# Patient Record
Sex: FEMALE | Race: WHITE | URBAN - METROPOLITAN AREA
[De-identification: names, ages, dates, MRNs, and addresses within clinical notes are randomized per-mention and may not be internally consistent; named-entity substitution may affect disease eponyms.]

---

## 2017-03-03 ENCOUNTER — OFFICE VISIT (OUTPATIENT)
Dept: URGENT CARE | Facility: RETAIL CLINIC | Age: 37
End: 2017-03-03
Payer: COMMERCIAL

## 2017-03-03 VITALS
OXYGEN SATURATION: 100 % | HEART RATE: 88 BPM | WEIGHT: 155.6 LBS | DIASTOLIC BLOOD PRESSURE: 73 MMHG | BODY MASS INDEX: 30.39 KG/M2 | SYSTOLIC BLOOD PRESSURE: 123 MMHG | TEMPERATURE: 98.1 F

## 2017-03-03 DIAGNOSIS — B35.8 TINEA FACIALE: ICD-10-CM

## 2017-03-03 DIAGNOSIS — R21 RASH: ICD-10-CM

## 2017-03-03 DIAGNOSIS — Z23 NEED FOR VACCINATION: Primary | ICD-10-CM

## 2017-03-03 PROCEDURE — 90471 IMMUNIZATION ADMIN: CPT | Performed by: NURSE PRACTITIONER

## 2017-03-03 PROCEDURE — 99213 OFFICE O/P EST LOW 20 MIN: CPT | Mod: 25 | Performed by: NURSE PRACTITIONER

## 2017-03-03 PROCEDURE — 90715 TDAP VACCINE 7 YRS/> IM: CPT | Performed by: NURSE PRACTITIONER

## 2017-03-03 RX ORDER — KETOCONAZOLE 20 MG/G
CREAM TOPICAL 2 TIMES DAILY
Qty: 30 G | Refills: 1 | Status: SHIPPED | OUTPATIENT
Start: 2017-03-03

## 2017-03-03 ASSESSMENT — PAIN SCALES - GENERAL: PAINLEVEL: NO PAIN (0)

## 2017-03-03 NOTE — PROGRESS NOTES
[facility] clinic note    SUBJECTIVE:  Sarah Stevens is a 36 year old female who presents to [facility] with chief complaint of a rash.  Onset of rash was 3 week(s) ago.   Rash is sudden onset and cyclic.  Location of the rash: bridge of nose up towards forehead.  Quality/symptoms of rash: itching, dry skin, redness and peals   Symptoms are mild and moderate and rash seems to be gets better & worse.  Previous history of a similar rash? No  Recent exposure history: none known    Associated symptoms include: itches.    Current Outpatient Prescriptions   Medication     Cyanocobalamin (VITAMIN B 12 PO)     IUD's (PARAGARD INTRAUTERINE COPPER)     IBUPROFEN PO     GuaiFENesin (MUCINEX PO)     No current facility-administered medications for this visit.        PAST MEDICAL HISTORY:   Past Medical History   Diagnosis Date     Asthma, exercise induced      Rhinitis, allergic, perennial        PAST SURGICAL HISTORY:   Past Surgical History   Procedure Laterality Date     No history of surgery       C/section, low transverse  07/13/10     , Low Transverse       FAMILY HISTORY:   Family History   Problem Relation Age of Onset     Arthritis Maternal Grandfather      Asthma Maternal Grandmother      CANCER Maternal Grandmother      metastatic     Arthritis Maternal Grandmother      HEART DISEASE Maternal Grandmother      Obesity Maternal Grandmother      CEREBROVASCULAR DISEASE Paternal Grandmother      Arthritis Paternal Grandmother      HEART DISEASE Paternal Grandmother      Depression Sister      eating disorder       SOCIAL HISTORY:   Social History   Substance Use Topics     Smoking status: Never Smoker     Smokeless tobacco: Never Used     Alcohol use No         ROS:     Need for vaccination  Rash      EXAM:   Vitals:    17 1147   BP: 123/73   BP Location: Right arm   Patient Position: Chair   Cuff Size: Adult Regular   Pulse: 88   Temp: 98.1  F (36.7  C)    TempSrc: Tympanic   SpO2: 100%   Weight: 155 lb 9.6 oz (70.6 kg)     GENERAL: alert, no acute distress.  SKIN: Rash description:    Distribution: localized  Location: face and at top/brdge of nose and partially onto the forehead.    Color: red scaley,  Lesion type: macular, plaque, blotchy with no other findings  GENERAL APPEARANCE: healthy, alert and no distress  EYES: EOMI,  PERRL, conjunctiva clear  HENT: ear canals and TM's normal.  Nose and mouth without ulcers, erythema or lesions  NECK: supple, non-tender to palpation, no adenopathy noted    ASSESSMENT:     Need for vaccination  Rash  Tinea faciale      PLAN:  Nizoral 2%  Treatment of pruritus can be difficult and often frustrating. Specific treatments exist for some, but not all.  Antihistamines are the most widely utilized agents for pruritus. (such as Benadryl & Zyrtec).  Appropriate skin care is imperative.  Avoidance of scratching, and therefore secondary skin irritation and perpetuation of the itch-scratch cycle, is also important.    Avoidance of contact irritants such as wool clothing, cleansing agents, and pet dander may be helpful.    Many forms of pruritus can be worsened by dry skin and may improve with treatment for dry skin, including use of a humidifier, maintaining a cool environment, avoiding hot baths/showers, and using only mild soaps.      Lubrication options discussed.    Topical antipruritics such as a camphor-based lotion or oatmeal baths may offer temporary relief.  OTC Treatments were reviewed with Sarah Stevens  Patient informed to F/U with PCP if symptoms worsen or do not resolve.    Information on the above diagnosis was given to the patient.  Observe for signs of superimposed infection and systemic symptoms  Reassurance was given to the patient.  Watch for signs of fever or worsening of the rash.    If not improving Follow up at:  [facility] [phone]    Adalid VALVERDE, MSN, Family  NP-C  [facility]

## 2017-03-03 NOTE — NURSING NOTE
Chief Complaint   Patient presents with     Derm Problem     on face. about 3 weeks. red, itchy. no drainage     Imm/Inj     tdap       Initial /73 (BP Location: Right arm, Patient Position: Chair, Cuff Size: Adult Regular)  Pulse 88  Temp 98.1  F (36.7  C) (Tympanic)  Wt 155 lb 9.6 oz (70.6 kg)  SpO2 100%  BMI 30.39 kg/m2 Estimated body mass index is 30.39 kg/(m^2) as calculated from the following:    Height as of 1/5/10: 5' (1.524 m).    Weight as of this encounter: 155 lb 9.6 oz (70.6 kg).  Medication Reconciliation: complete   Idalia Love CMA (AAMA)

## 2017-03-03 NOTE — MR AVS SNAPSHOT
After Visit Summary   3/3/2017    Sarah Stevens    MRN: [id number]           Patient Information     Date Of Birth          1980        Visit Information        Provider Department      3/3/2017 11:20 AM Adalid Stokes APRN [facility]        Today's Diagnoses     Need for vaccination    -  1    Rash        Tinea faciale           Follow-ups after your visit        Who to contact     You can reach your care team any time of the day by calling [phone].  Notification of test results:  If you have an abnormal lab result, we will notify you by phone as soon as possible.         Additional Information About Your Visit        MyChart Information     [employer] gives you secure access to your electronic health record. If you see a primary care provider, you can also send messages to your care team and make appointments. If you have questions, please call your primary care clinic.  If you do not have a primary care provider, please call [phone] and they will assist you.        Care EveryWhere ID     This is your Care EveryWhere ID. This could be used by other organizations to access your [place] medical records  [id number]        Your Vitals Were     Pulse Temperature Pulse Oximetry BMI (Body Mass Index)          88 98.1  F (36.7  C) (Tympanic) 100% 30.39 kg/m2         Blood Pressure from Last 3 Encounters:   03/03/17 123/73   06/07/15 92/62   12/15/13 107/72    Weight from Last 3 Encounters:   03/03/17 155 lb 9.6 oz (70.6 kg)   06/07/15 142 lb (64.4 kg)   09/15/10 131 lb 9.6 oz (59.7 kg)              We Performed the Following     1st  Administration  [88056]     TDAP (ADACEL AGES 11-64)          Today's Medication Changes          These changes are accurate as of: 3/3/17 12:22 PM.  If you have any questions, ask your nurse or doctor.               Start taking these medicines.        Dose/Directions    ketoconazole 2 % cream   Commonly known as:  NIZORAL   Used  for:  Rash, Tinea faciale   Started by:  Adalid Stokes APRN CNP        Apply topically 2 times daily Usually requires 2 weeks of application.   Quantity:  30 g   Refills:  1            Where to get your medicines      These medications were sent to [facility] - [place] - [address]  [address], [place] [address]     Phone:  [phone]     ketoconazole 2 % cream                Primary Care Provider Office Phone # Fax #    Lorenza Nunu Jones [phone]       [facility] [address] [place] [address]        Thank you!     Thank you for choosing [facility]  for your care. Our goal is always to provide you with excellent care. Hearing back from our patients is one way we can continue to improve our services. Please take a few minutes to complete the written survey that you may receive in the mail after your visit with us. Thank you!             Your Updated Medication List - Protect others around you: Learn how to safely use, store and throw away your medicines at www.disposemymeds.org.          This list is accurate as of: 3/3/17 12:22 PM.  Always use your most recent med list.                   Brand Name Dispense Instructions for use    IBUPROFEN PO      Take 400 mg by mouth Reported on 3/3/2017       ketoconazole 2 % cream    NIZORAL    30 g    Apply topically 2 times daily Usually requires 2 weeks of application.       MUCINEX PO      Reported on 3/3/2017       paragard intrauterine copper     1 Device    1 each by Intrauterine route once.       VITAMIN B 12 PO      Take 250 mcg by mouth

## 2017-03-10 ENCOUNTER — OFFICE VISIT (OUTPATIENT)
Dept: URGENT CARE | Facility: RETAIL CLINIC | Age: 37
End: 2017-03-10
Payer: COMMERCIAL

## 2017-03-10 VITALS
OXYGEN SATURATION: 99 % | DIASTOLIC BLOOD PRESSURE: 70 MMHG | HEART RATE: 65 BPM | SYSTOLIC BLOOD PRESSURE: 116 MMHG | TEMPERATURE: 97.7 F

## 2017-03-10 DIAGNOSIS — J45.901 ASTHMA EXACERBATION: ICD-10-CM

## 2017-03-10 DIAGNOSIS — R05.9 COUGH: ICD-10-CM

## 2017-03-10 DIAGNOSIS — J20.9 ACUTE BRONCHITIS WITH SYMPTOMS > 10 DAYS: Primary | ICD-10-CM

## 2017-03-10 PROCEDURE — 99213 OFFICE O/P EST LOW 20 MIN: CPT | Performed by: PHYSICIAN ASSISTANT

## 2017-03-10 RX ORDER — AZITHROMYCIN 250 MG/1
TABLET, FILM COATED ORAL
Qty: 6 TABLET | Refills: 0 | Status: SHIPPED | OUTPATIENT
Start: 2017-03-10

## 2017-03-10 RX ORDER — CODEINE PHOSPHATE AND GUAIFENESIN 10; 100 MG/5ML; MG/5ML
1-2 SOLUTION ORAL EVERY 4 HOURS PRN
Qty: 120 ML | Refills: 0 | Status: SHIPPED | OUTPATIENT
Start: 2017-03-10

## 2017-03-10 RX ORDER — ALBUTEROL SULFATE 0.83 MG/ML
1 SOLUTION RESPIRATORY (INHALATION) EVERY 6 HOURS PRN
Qty: 25 VIAL | Refills: 0 | Status: SHIPPED | OUTPATIENT
Start: 2017-03-10

## 2017-03-10 RX ORDER — ALBUTEROL SULFATE 90 UG/1
2 AEROSOL, METERED RESPIRATORY (INHALATION) EVERY 4 HOURS PRN
Qty: 1 INHALER | Refills: 0 | Status: SHIPPED | OUTPATIENT
Start: 2017-03-10

## 2017-03-10 NOTE — PROGRESS NOTES
"Chief Complaint   Patient presents with     Cough     at least 2 weeks; productive at times, hard to breahte; worsening in past 3 days     SUBJECTIVE:  Sarah Stevens is a 36 year old female who presents to the clinic today with her {parent:447036} with a chief complaint of {UC COUGH CC:465311} for {NUMBERS 1-12:10} {TIME UNITS:9528}.  Her cough is described as {UC COUGH:718135}.  The patient's symptoms are {severity:795288} and {SYMPTOM CHANGE (CP):31658}.  Associated symptoms include {COUGH SX (CP):923596}.  The patient's symptoms are exacerbated by {SYMPTOM EXACERBATED (CP):93709::\"no particular triggers\"}.  Patient has been using {OTC MEDS (CP):34736} to improve symptoms.  Predisposing factors include: {URI PRECIPITATING FACTORS:958435}.    Past Medical History   Diagnosis Date     Asthma, exercise induced      Rhinitis, allergic, perennial      Current Outpatient Prescriptions   Medication Sig Dispense Refill     ketoconazole (NIZORAL) 2 % cream Apply topically 2 times daily Usually requires 2 weeks of application. 30 g 1     IBUPROFEN PO Take 400 mg by mouth Reported on 3/3/2017       Cyanocobalamin (VITAMIN B 12 PO) Take 250 mcg by mouth       GuaiFENesin (MUCINEX PO) Reported on 3/3/2017       IUD's (PARAGARD INTRAUTERINE COPPER) 1 each by Intrauterine route once. 1 Device 0     Social History   Substance Use Topics     Smoking status: Never Smoker     Smokeless tobacco: Never Used     Alcohol use No     Allergies   Allergen Reactions     Ceclor [Cefaclor]      Had a reaction as a baby. Does not know what it was.      Penicillins Rash     ROS  Review of systems negative except as stated above.    OBJECTIVE:  /70 (BP Location: Left arm)  Pulse 65  Temp 97.7  F (36.5  C) (Oral)  SpO2 99%  GENERAL APPEARANCE: healthy, alert and in no distress  HEENT: PERRL, conjunctiva clear. Bilateral ear canals and TM's normal. Nose {WITH/OUT:885963} erythematous or edematous turbinates. Posterior pharynx nonerythematous " and without tonsillar hypertrophy or exudate.  NECK: supple, nontender, no lymphadenopathy  RESP: lungs clear to auscultation - no rales, rhonchi or wheezes. Breathing is comfortable, not labored and without use of accessory muscles.  CV: regular rates and rhythm, normal S1 S2, no murmur noted  ABDOMEN:  soft, nontender, no HSM or masses and bowel sounds normal    ASSESSMENT:  No diagnosis found.  PLAN:   There are no Patient Instructions on file for this visit.  Follow up with primary care provider with any problems, questions or concerns or if symptoms worsen or fail to improve. Patient agreed to plan and verbalized understanding.    Barbara Otoole PA-C  [facility] [place]

## 2017-03-10 NOTE — MR AVS SNAPSHOT
After Visit Summary   3/10/2017    Sarah Stevens    MRN: [id number]           Patient Information     Date Of Birth          1980        Visit Information        Provider Department      3/10/2017 11:00 AM Adry Otoole PA-C [facility]        Today's Diagnoses     Acute bronchitis with symptoms > 10 days    -  1    Cough        Asthma exacerbation          Care Instructions    Azithromycin 250 mg as directed- 2 pills together at the same time today then 1 pill a day for the next 4 days.  Albuterol inhaler or nebulizer every 4 hours as needed for cough.  Tessalon Perles- Take 1-2 capsules by mouth 3 times daily as needed for cough.  Robitussin with codeine as needed for cough before sleep, up to every 4-6 hours. DO NOT take before driving a vehicle.    Rest! Your body needs more rest to heal.  Drink plenty of fluids (warm fluids like tea or soup are soothing and reduce cough)  Sit in the bathroom with a hot shower running and breathe in the steam.  Honey may soothe your sore throat and help manage your cough- may take straight or in warm water with lemon juice.  Avoid smoke from cigarettes, fireplace, bonfire etc.  Take Tylenol or an NSAID such as ibuprofen or naproxen as needed for pain.  Delsym (dextromethorphan) is a 12 hour over the counter cough medication   Mucinex or Robitussin (guiafenesin) thin mucus and may help it to loosen more quickly  Good handwashing is the best way to prevent spread of germs  Present to emergency room if you develop trouble breathing, swallowing or cough-up blood.  Follow up with your primary care provider if symptoms worsen or fail to improve as expected.          Follow-ups after your visit        Who to contact     You can reach your care team any time of the day by calling [phone].  Notification of test results:  If you have an abnormal lab result, we will notify you by phone as soon as possible.         Additional Information  About Your Visit        TC Website PromotionsharINFOGRAPHIQS Information     [employer] gives you secure access to your electronic health record. If you see a primary care provider, you can also send messages to your care team and make appointments. If you have questions, please call your primary care clinic.  If you do not have a primary care provider, please call [phone] and they will assist you.        Care EveryWhere ID     This is your Care EveryWhere ID. This could be used by other organizations to access your [place] medical records  [id number]        Your Vitals Were     Pulse Temperature Pulse Oximetry             65 97.7  F (36.5  C) (Oral) 99%          Blood Pressure from Last 3 Encounters:   03/10/17 116/70   03/03/17 123/73   06/07/15 92/62    Weight from Last 3 Encounters:   03/03/17 155 lb 9.6 oz (70.6 kg)   06/07/15 142 lb (64.4 kg)   09/15/10 131 lb 9.6 oz (59.7 kg)              Today, you had the following     No orders found for display         Today's Medication Changes          These changes are accurate as of: 3/10/17 11:17 AM.  If you have any questions, ask your nurse or doctor.               Start taking these medicines.        Dose/Directions    * albuterol 108 (90 BASE) MCG/ACT Inhaler   Commonly known as:  PROAIR HFA/PROVENTIL HFA/VENTOLIN HFA   Used for:  Acute bronchitis with symptoms > 10 days, Cough, Asthma exacerbation        Dose:  2 puff   Inhale 2 puffs into the lungs every 4 hours as needed for shortness of breath / dyspnea or wheezing   Quantity:  1 Inhaler   Refills:  0       * albuterol (2.5 MG/3ML) 0.083% neb solution   Used for:  Acute bronchitis with symptoms > 10 days, Cough, Asthma exacerbation        Dose:  1 vial   Take 1 vial (2.5 mg) by nebulization every 6 hours as needed for shortness of breath / dyspnea or wheezing   Quantity:  25 vial   Refills:  0       azithromycin 250 MG tablet   Commonly known as:  ZITHROMAX   Used for:  Acute bronchitis with symptoms > 10 days, Cough        Two  tablets first day, then one tablet daily for four days.   Quantity:  6 tablet   Refills:  0       guaiFENesin-codeine 100-10 MG/5ML Soln solution   Commonly known as:  ROBITUSSIN AC   Used for:  Cough        Dose:  1-2 tsp.   Take 5-10 mLs by mouth every 4 hours as needed for cough   Quantity:  120 mL   Refills:  0       * Notice:  This list has 2 medication(s) that are the same as other medications prescribed for you. Read the directions carefully, and ask your doctor or other care provider to review them with you.         Where to get your medicines      These medications were sent to [id number] - [place], [place] - [address]  [address] [address] [address]     Phone:  [phone]     albuterol (2.5 MG/3ML) 0.083% neb solution    albuterol 108 (90 BASE) MCG/ACT Inhaler    azithromycin 250 MG tablet         Some of these will need a paper prescription and others can be bought over the counter.  Ask your nurse if you have questions.     Bring a paper prescription for each of these medications     guaiFENesin-codeine 100-10 MG/5ML Soln solution                Primary Care Provider Office Phone # Fax #    Lorenza Nunu Jones [phone]       [facility] [address] [place] [address]        Thank you!     Thank you for choosing [facility]  for your care. Our goal is always to provide you with excellent care. Hearing back from our patients is one way we can continue to improve our services. Please take a few minutes to complete the written survey that you may receive in the mail after your visit with us. Thank you!             Your Updated Medication List - Protect others around you: Learn how to safely use, store and throw away your medicines at www.disposemymeds.org.          This list is accurate as of: 3/10/17 11:17 AM.  Always use your most recent med list.                   Brand Name Dispense Instructions for use    * albuterol  108 (90 BASE) MCG/ACT Inhaler    PROAIR HFA/PROVENTIL HFA/VENTOLIN HFA    1 Inhaler    Inhale 2 puffs into the lungs every 4 hours as needed for shortness of breath / dyspnea or wheezing       * albuterol (2.5 MG/3ML) 0.083% neb solution     25 vial    Take 1 vial (2.5 mg) by nebulization every 6 hours as needed for shortness of breath / dyspnea or wheezing       azithromycin 250 MG tablet    ZITHROMAX    6 tablet    Two tablets first day, then one tablet daily for four days.       guaiFENesin-codeine 100-10 MG/5ML Soln solution    ROBITUSSIN AC    120 mL    Take 5-10 mLs by mouth every 4 hours as needed for cough       IBUPROFEN PO      Take 400 mg by mouth Reported on 3/3/2017       ketoconazole 2 % cream    NIZORAL    30 g    Apply topically 2 times daily Usually requires 2 weeks of application.       MUCINEX PO      Reported on 3/3/2017       paragard intrauterine copper     1 Device    1 each by Intrauterine route once.       VITAMIN B 12 PO      Take 250 mcg by mouth       * Notice:  This list has 2 medication(s) that are the same as other medications prescribed for you. Read the directions carefully, and ask your doctor or other care provider to review them with you.

## 2017-03-10 NOTE — PROGRESS NOTES
Chief Complaint   Patient presents with     Cough     at least 2 weeks; productive at times, hard to breahte; worsening in past 3 days     SUBJECTIVE:  Sarah Stevens is a 36 year old female who presents to the clinic today with a chief complaint of cough  for 2 weeks.  Her cough is described as persistent and productive of yellow sputum.  The patient's symptoms are moderate and worsening.  Associated symptoms include congestion, nasal congestion, wheezing and headache.  The patient's symptoms are exacerbated by lying down.  Patient has been using albuterol nebs to improve symptoms.  Predisposing factors include: exposure to  with similar symptoms- he had a cough for 6 weeks, resolved with azithromycin.    Past Medical History   Diagnosis Date     Asthma, exercise induced      Rhinitis, allergic, perennial      Current Outpatient Prescriptions   Medication Sig Dispense Refill     azithromycin (ZITHROMAX) 250 MG tablet Two tablets first day, then one tablet daily for four days. 6 tablet 0     albuterol (PROAIR HFA/PROVENTIL HFA/VENTOLIN HFA) 108 (90 BASE) MCG/ACT Inhaler Inhale 2 puffs into the lungs every 4 hours as needed for shortness of breath / dyspnea or wheezing 1 Inhaler 0     albuterol (2.5 MG/3ML) 0.083% neb solution Take 1 vial (2.5 mg) by nebulization every 6 hours as needed for shortness of breath / dyspnea or wheezing 25 vial 0     guaiFENesin-codeine (ROBITUSSIN AC) 100-10 MG/5ML SOLN solution Take 5-10 mLs by mouth every 4 hours as needed for cough 120 mL 0     ketoconazole (NIZORAL) 2 % cream Apply topically 2 times daily Usually requires 2 weeks of application. 30 g 1     IBUPROFEN PO Take 400 mg by mouth Reported on 3/3/2017       Cyanocobalamin (VITAMIN B 12 PO) Take 250 mcg by mouth       GuaiFENesin (MUCINEX PO) Reported on 3/3/2017       IUD's (PARAGARD INTRAUTERINE COPPER) 1 each by Intrauterine route once. 1 Device 0     Social History   Substance Use Topics     Smoking status: Never  Smoker     Smokeless tobacco: Never Used     Alcohol use No     Allergies   Allergen Reactions     Alma Delia [Cefaclor]      Had a reaction as a baby. Does not know what it was.      Penicillins Rash     ROS  Review of systems negative except as stated above.    OBJECTIVE:  /70 (BP Location: Left arm)  Pulse 65  Temp 97.7  F (36.5  C) (Oral)  SpO2 99%  GENERAL APPEARANCE: healthy, alert and in no distress  HEENT: PERRL, conjunctiva clear. Bilateral ear canals and TM's normal. Nose without erythematous or edematous turbinates. Posterior pharynx nonerythematous and without tonsillar hypertrophy or exudate.  NECK: supple, nontender, no lymphadenopathy  RESP: lungs mildly wheezy to auscultation - no rales, rhonchi. Breathing is comfortable, not labored and without use of accessory muscles.  CV: regular rates and rhythm, normal S1 S2, no murmur noted    ASSESSMENT:    ICD-10-CM    1. Acute bronchitis with symptoms > 10 days J20.9 azithromycin (ZITHROMAX) 250 MG tablet     albuterol (PROAIR HFA/PROVENTIL HFA/VENTOLIN HFA) 108 (90 BASE) MCG/ACT Inhaler     albuterol (2.5 MG/3ML) 0.083% neb solution   2. Cough R05 azithromycin (ZITHROMAX) 250 MG tablet     albuterol (PROAIR HFA/PROVENTIL HFA/VENTOLIN HFA) 108 (90 BASE) MCG/ACT Inhaler     albuterol (2.5 MG/3ML) 0.083% neb solution     guaiFENesin-codeine (ROBITUSSIN AC) 100-10 MG/5ML SOLN solution   3. Asthma exacerbation J45.901 albuterol (PROAIR HFA/PROVENTIL HFA/VENTOLIN HFA) 108 (90 BASE) MCG/ACT Inhaler     albuterol (2.5 MG/3ML) 0.083% neb solution     PLAN:   Discussed pertussis testing, patient declined.  She is a  and will not work for 5 days to prevent spread.  Patient Instructions   Azithromycin 250 mg as directed- 2 pills together at the same time today then 1 pill a day for the next 4 days.  Albuterol inhaler or nebulizer every 4 hours as needed for cough.  Tessalon Perles- Take 1-2 capsules by mouth 3 times daily as needed for  cough.  Robitussin with codeine as needed for cough before sleep, up to every 4-6 hours. DO NOT take before driving a vehicle.    Rest! Your body needs more rest to heal.  Drink plenty of fluids (warm fluids like tea or soup are soothing and reduce cough)  Sit in the bathroom with a hot shower running and breathe in the steam.  Honey may soothe your sore throat and help manage your cough- may take straight or in warm water with lemon juice.  Avoid smoke from cigarettes, fireplace, bonfire etc.  Take Tylenol or an NSAID such as ibuprofen or naproxen as needed for pain.  Delsym (dextromethorphan) is a 12 hour over the counter cough medication   Mucinex or Robitussin (guiafenesin) thin mucus and may help it to loosen more quickly  Good handwashing is the best way to prevent spread of germs  Present to emergency room if you develop trouble breathing, swallowing or cough-up blood.  Follow up with your primary care provider if symptoms worsen or fail to improve as expected.      Follow up with primary care provider with any problems, questions or concerns or if symptoms worsen or fail to improve. Patient agreed to plan and verbalized understanding.    Barbara Otoole PA-C  [facility] [place]

## 2017-03-10 NOTE — NURSING NOTE
Chief Complaint   Patient presents with     Cough     at least 2 weeks; productive at times, hard to breahte; worsening in past 3 days       Initial /70 (BP Location: Left arm)  Pulse 65  Temp 97.7  F (36.5  C) (Oral)  SpO2 99% Estimated body mass index is 30.39 kg/(m^2) as calculated from the following:    Height as of 1/5/10: 5' (1.524 m).    Weight as of 3/3/17: 155 lb 9.6 oz (70.6 kg).  Medication Reconciliation: complete

## 2017-03-10 NOTE — PATIENT INSTRUCTIONS
Azithromycin 250 mg as directed- 2 pills together at the same time today then 1 pill a day for the next 4 days.  Albuterol inhaler or nebulizer every 4 hours as needed for cough.  Tessalon Perles- Take 1-2 capsules by mouth 3 times daily as needed for cough.  Robitussin with codeine as needed for cough before sleep, up to every 4-6 hours. DO NOT take before driving a vehicle.    Rest! Your body needs more rest to heal.  Drink plenty of fluids (warm fluids like tea or soup are soothing and reduce cough)  Sit in the bathroom with a hot shower running and breathe in the steam.  Honey may soothe your sore throat and help manage your cough- may take straight or in warm water with lemon juice.  Avoid smoke from cigarettes, fireplace, bonfire etc.  Take Tylenol or an NSAID such as ibuprofen or naproxen as needed for pain.  Delsym (dextromethorphan) is a 12 hour over the counter cough medication   Mucinex or Robitussin (guiafenesin) thin mucus and may help it to loosen more quickly  Good handwashing is the best way to prevent spread of germs  Present to emergency room if you develop trouble breathing, swallowing or cough-up blood.  Follow up with your primary care provider if symptoms worsen or fail to improve as expected.

## 2017-05-26 ENCOUNTER — HEALTH MAINTENANCE LETTER (OUTPATIENT)
Age: 37
End: 2017-05-26

## 2019-09-28 ENCOUNTER — HEALTH MAINTENANCE LETTER (OUTPATIENT)
Age: 39
End: 2019-09-28

## 2019-10-23 ENCOUNTER — OFFICE VISIT (OUTPATIENT)
Dept: URGENT CARE | Facility: RETAIL CLINIC | Age: 39
End: 2019-10-23
Payer: COMMERCIAL

## 2019-10-23 VITALS
RESPIRATION RATE: 16 BRPM | OXYGEN SATURATION: 99 % | SYSTOLIC BLOOD PRESSURE: 116 MMHG | DIASTOLIC BLOOD PRESSURE: 71 MMHG | TEMPERATURE: 98.5 F | HEART RATE: 80 BPM

## 2019-10-23 DIAGNOSIS — L03.011 PARONYCHIA OF RIGHT THUMB: Primary | ICD-10-CM

## 2019-10-23 PROCEDURE — 99213 OFFICE O/P EST LOW 20 MIN: CPT | Performed by: NURSE PRACTITIONER

## 2019-10-23 RX ORDER — DOXYCYCLINE 100 MG/1
100 CAPSULE ORAL 2 TIMES DAILY
Qty: 20 CAPSULE | Refills: 0 | Status: SHIPPED | OUTPATIENT
Start: 2019-10-23 | End: 2019-11-02

## 2019-10-23 ASSESSMENT — ENCOUNTER SYMPTOMS
FEVER: 0
NAUSEA: 0
PARESTHESIAS: 0
ACTIVITY CHANGE: 0
APPETITE CHANGE: 0
WEAKNESS: 0
VOMITING: 0
PALPITATIONS: 0
ABDOMINAL PAIN: 0
NUMBNESS: 0
ARTHRALGIAS: 0
MYALGIAS: 0
COLOR CHANGE: 1
CHILLS: 0
FATIGUE: 0
DIAPHORESIS: 0

## 2019-10-23 NOTE — PROGRESS NOTES
Chief Complaint   Patient presents with     Thumb Discomfort     X 1 day      SUBJECTIVE:  Sarah Stevens is a 39 year old female who presents to the clinic today for a swollen, warm, painful red R thumb.  Onset was 1 day ago.   Rash is gradual onset and worsening.   Associated symptoms include: nothing.  Symptoms are moderate and rash seems to be worsening.  Previous history of a similar rash? No  Treatment measures tried include: epsom salt soaks  Relevant history: had a hang nail    Past Medical History:   Diagnosis Date     Asthma, exercise induced      Rhinitis, allergic, perennial      albuterol (2.5 MG/3ML) 0.083% neb solution, Take 1 vial (2.5 mg) by nebulization every 6 hours as needed for shortness of breath / dyspnea or wheezing  albuterol (PROAIR HFA/PROVENTIL HFA/VENTOLIN HFA) 108 (90 BASE) MCG/ACT Inhaler, Inhale 2 puffs into the lungs every 4 hours as needed for shortness of breath / dyspnea or wheezing  IBUPROFEN PO, Take 400 mg by mouth Reported on 3/3/2017  IUD's (PARAGARD INTRAUTERINE COPPER), 1 each by Intrauterine route once.  azithromycin (ZITHROMAX) 250 MG tablet, Two tablets first day, then one tablet daily for four days. (Patient not taking: Reported on 10/23/2019)  Cyanocobalamin (VITAMIN B 12 PO), Take 250 mcg by mouth  GuaiFENesin (MUCINEX PO), Reported on 3/3/2017  guaiFENesin-codeine (ROBITUSSIN AC) 100-10 MG/5ML SOLN solution, Take 5-10 mLs by mouth every 4 hours as needed for cough (Patient not taking: Reported on 10/23/2019)  ketoconazole (NIZORAL) 2 % cream, Apply topically 2 times daily Usually requires 2 weeks of application. (Patient not taking: Reported on 10/23/2019)    No current facility-administered medications on file prior to visit.     Social History     Tobacco Use     Smoking status: Never Smoker     Smokeless tobacco: Never Used   Substance Use Topics     Alcohol use: No     Allergies   Allergen Reactions     Ceclor [Cefaclor]      Had a reaction as a baby. Does not  know what it was.      Penicillins Rash     Review of Systems   Constitutional: Negative for activity change, appetite change, chills, diaphoresis, fatigue and fever.   Cardiovascular: Negative for chest pain, palpitations and peripheral edema.   Gastrointestinal: Negative for abdominal pain, nausea and vomiting.   Musculoskeletal: Negative for arthralgias and myalgias.   Skin: Positive for color change (swollen red warm R thumb). Negative for rash.        Tenderness over thumb   Neurological: Negative for weakness, numbness and paresthesias.     EXAM:   /71 (BP Location: Left arm, Patient Position: Sitting, Cuff Size: Adult Regular)   Pulse 80   Temp 98.5  F (36.9  C) (Oral)   Resp 16   SpO2 99%     Physical Exam  Vitals signs reviewed.   Constitutional:       Appearance: Normal appearance.   HENT:      Head: Normocephalic and atraumatic.   Cardiovascular:      Rate and Rhythm: Normal rate.   Pulmonary:      Effort: Pulmonary effort is normal.   Musculoskeletal: Normal range of motion.         General: Swelling (of right thumb) and tenderness present.   Skin:     General: Skin is warm and dry.      Findings: Erythema (right thumb) present.      Comments: No purulent drainage   Neurological:      General: No focal deficit present.      Mental Status: She is alert and oriented to person, place, and time.   Psychiatric:         Mood and Affect: Mood normal.         Behavior: Behavior normal.       ASSESSMENT:    ICD-10-CM    1. Paronychia of right thumb L03.011 doxycycline hyclate (VIBRAMYCIN) 100 MG capsule     PLAN:  Patient Instructions   Doxycycline for paronychia soft tissue infection 5-10 days depending on response, no dairy with this  Ibuprofen as needed for pain- may take 2-3 tablets up to 3 times daily with food.  Apply a warm wet compress or soak in warm water with epsom salt for 10 minutes, 3-4 times a day to help area to heal.  Discussed close evaluation of symptoms.   Monitor for signs of  infection including fever, thick yellow/white discharge, a hard or soft lump under the skin or increasing pain, redness, warmth and/or swelling. If you have any of these or persistent symptoms that are not healing, follow up with your primary care provider.  ED if no response in 48 hours, worsening redness, pain, swelling, fever, chills, nausea    Follow up with primary care provider with any problems, questions or concerns or if symptoms worsen or fail to improve. Patient agreed to plan and verbalized understanding.    Emilia Fox, BRAN-BC  [facility]

## 2019-10-23 NOTE — PATIENT INSTRUCTIONS
Doxycycline for paronychia soft tissue infection 5-10 days depending on response, no dairy with this  Ibuprofen as needed for pain- may take 2-3 tablets up to 3 times daily with food.  Apply a warm wet compress or soak in warm water with epsom salt for 10 minutes, 3-4 times a day to help area to heal.  Discussed close evaluation of symptoms.   Monitor for signs of infection including fever, thick yellow/white discharge, a hard or soft lump under the skin or increasing pain, redness, warmth and/or swelling. If you have any of these or persistent symptoms that are not healing, follow up with your primary care provider.  ED if no response in 48 hours, worsening redness, pain, swelling, fever, chills, nausea

## 2021-01-09 ENCOUNTER — HEALTH MAINTENANCE LETTER (OUTPATIENT)
Age: 41
End: 2021-01-09

## 2021-10-23 ENCOUNTER — HEALTH MAINTENANCE LETTER (OUTPATIENT)
Age: 41
End: 2021-10-23

## 2022-02-12 ENCOUNTER — HEALTH MAINTENANCE LETTER (OUTPATIENT)
Age: 42
End: 2022-02-12

## 2022-10-09 ENCOUNTER — HEALTH MAINTENANCE LETTER (OUTPATIENT)
Age: 42
End: 2022-10-09

## 2023-02-18 ENCOUNTER — HEALTH MAINTENANCE LETTER (OUTPATIENT)
Age: 43
End: 2023-02-18

## 2023-10-30 NOTE — PROGRESS NOTES
Prior to injection verified patient identity using patient's name and date of birth.  Screening Questionnaire for Adult Immunization    Are you sick today?   No   Do you have allergies to medications, food, a vaccine component or latex?   No   Have you ever had a serious reaction after receiving a vaccination?   No   Do you have a long-term health problem with heart disease, lung disease, asthma, kidney disease, metabolic disease (e.g. diabetes), anemia, or other blood disorder?   No   Do you have cancer, leukemia, HIV/AIDS, or any other immune system problem?   No   In the past 3 months, have you taken medications that affect  your immune system, such as prednisone, other steroids, or anticancer drugs; drugs for the treatment of rheumatoid arthritis, Crohn s disease, or psoriasis; or have you had radiation treatments?   No   Have you had a seizure, or a brain or other nervous system problem?   No   During the past year, have you received a transfusion of blood or blood     products, or been given immune (gamma) globulin or antiviral drug?   No   For women: Are you pregnant or is there a chance you could become        pregnant during the next month?   No   Have you received any vaccinations in the past 4 weeks?   No     Immunization questionnaire answers were all negative.      MNVFC doesn't apply on this patient    Patient instructed to remain in clinic for 20 minutes afterwards, and to report any adverse reaction to me immediately.       Screening performed by Idalia Love on 3/3/2017 at 11:55 AM.     RTC to schedule a f/u appointment with Dr. Grace Rosas. On Monday 11/13/23 at 8:00 a virtual.

## 2024-03-16 ENCOUNTER — HEALTH MAINTENANCE LETTER (OUTPATIENT)
Age: 44
End: 2024-03-16